# Patient Record
Sex: FEMALE | Race: WHITE | NOT HISPANIC OR LATINO | Employment: FULL TIME | URBAN - METROPOLITAN AREA
[De-identification: names, ages, dates, MRNs, and addresses within clinical notes are randomized per-mention and may not be internally consistent; named-entity substitution may affect disease eponyms.]

---

## 2017-09-20 ENCOUNTER — OFFICE VISIT (OUTPATIENT)
Dept: OBSTETRICS AND GYNECOLOGY | Facility: CLINIC | Age: 32
End: 2017-09-20
Payer: COMMERCIAL

## 2017-09-20 VITALS
BODY MASS INDEX: 37.82 KG/M2 | WEIGHT: 227 LBS | DIASTOLIC BLOOD PRESSURE: 86 MMHG | SYSTOLIC BLOOD PRESSURE: 126 MMHG | HEIGHT: 65 IN

## 2017-09-20 DIAGNOSIS — B96.89 BACTERIAL VAGINOSIS: ICD-10-CM

## 2017-09-20 DIAGNOSIS — Z30.011 ENCOUNTER FOR INITIAL PRESCRIPTION OF CONTRACEPTIVE PILLS: ICD-10-CM

## 2017-09-20 DIAGNOSIS — Z01.419 ENCOUNTER FOR GYNECOLOGICAL EXAMINATION (GENERAL) (ROUTINE) WITHOUT ABNORMAL FINDINGS: Primary | ICD-10-CM

## 2017-09-20 DIAGNOSIS — Z11.3 SCREENING FOR GONORRHEA: ICD-10-CM

## 2017-09-20 DIAGNOSIS — Z12.4 SCREENING FOR CERVICAL CANCER: ICD-10-CM

## 2017-09-20 DIAGNOSIS — Z30.09 ENCOUNTER FOR OTHER GENERAL COUNSELING OR ADVICE ON CONTRACEPTION: ICD-10-CM

## 2017-09-20 DIAGNOSIS — N76.0 BACTERIAL VAGINOSIS: ICD-10-CM

## 2017-09-20 PROCEDURE — 87480 CANDIDA DNA DIR PROBE: CPT

## 2017-09-20 PROCEDURE — 87591 N.GONORRHOEAE DNA AMP PROB: CPT

## 2017-09-20 PROCEDURE — 99999 PR PBB SHADOW E&M-NEW PATIENT-LVL III: CPT | Mod: PBBFAC,,, | Performed by: OBSTETRICS & GYNECOLOGY

## 2017-09-20 PROCEDURE — 99385 PREV VISIT NEW AGE 18-39: CPT | Mod: S$GLB,,, | Performed by: OBSTETRICS & GYNECOLOGY

## 2017-09-20 PROCEDURE — 88175 CYTOPATH C/V AUTO FLUID REDO: CPT

## 2017-09-20 PROCEDURE — 87660 TRICHOMONAS VAGIN DIR PROBE: CPT

## 2017-09-20 RX ORDER — NORGESTIMATE AND ETHINYL ESTRADIOL 7DAYSX3 28
1 KIT ORAL DAILY
Qty: 28 TABLET | Refills: 11 | Status: ON HOLD | OUTPATIENT
Start: 2017-09-20 | End: 2018-05-30 | Stop reason: HOSPADM
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
## 2017-09-22 ENCOUNTER — TELEPHONE (OUTPATIENT)
Dept: OBSTETRICS AND GYNECOLOGY | Facility: CLINIC | Age: 32
End: 2017-09-22

## 2017-09-22 DIAGNOSIS — N76.0 BACTERIAL VAGINOSIS: Primary | ICD-10-CM

## 2017-09-22 DIAGNOSIS — B96.89 BACTERIAL VAGINOSIS: Primary | ICD-10-CM

## 2017-09-22 LAB
C TRACH DNA SPEC QL NAA+PROBE: NOT DETECTED
CANDIDA RRNA VAG QL PROBE: NEGATIVE
G VAGINALIS RRNA GENITAL QL PROBE: POSITIVE
N GONORRHOEA DNA SPEC QL NAA+PROBE: NOT DETECTED
T VAGINALIS RRNA GENITAL QL PROBE: NEGATIVE

## 2017-09-22 RX ORDER — METRONIDAZOLE 7.5 MG/G
1 GEL VAGINAL DAILY
Qty: 5 APPLICATOR | Refills: 0 | Status: SHIPPED | OUTPATIENT
Start: 2017-09-22 | End: 2017-09-27

## 2017-09-22 NOTE — TELEPHONE ENCOUNTER
Pt. wants gel ([facility]). ssmitbar,karinan    Please advise her vaginal culture is positive for bacterial vaginosis.  This is overgrowth of your normal bacteria.  Does she prefer pills or vaginal gel for treatment?  Cultures are negative for gonorrhea and chlamydia

## 2018-04-20 ENCOUNTER — TELEPHONE (OUTPATIENT)
Dept: OBSTETRICS AND GYNECOLOGY | Facility: CLINIC | Age: 33
End: 2018-04-20

## 2018-04-20 NOTE — TELEPHONE ENCOUNTER
----- Message from Meme Mchugh sent at 4/20/2018  8:59 AM CDT -----  Contact: Pt  She is calling in regards to having issues with menstrual issues, pain and excessive bleeding and would like to see if she can be worked into the schedule for around the 30th and would like a callback at [phone] work, [phone] cell

## 2018-04-20 NOTE — TELEPHONE ENCOUNTER
Spoke to patient and she stated she would like an appointment close to the 30th of April because she wanted her menstrual cycle to be over with. Informed patient that Dr. Vogel does not have an appointment available during that date. Offered her to see a different NP or MD. Scheduled appointment for 04/30/18 at 9:30am at the '[place] location with Mary Lou Werner. Patient verbalized understanding.

## 2018-04-30 ENCOUNTER — OFFICE VISIT (OUTPATIENT)
Dept: OBSTETRICS AND GYNECOLOGY | Facility: CLINIC | Age: 33
End: 2018-04-30
Payer: COMMERCIAL

## 2018-04-30 VITALS
DIASTOLIC BLOOD PRESSURE: 100 MMHG | WEIGHT: 208.13 LBS | SYSTOLIC BLOOD PRESSURE: 153 MMHG | BODY MASS INDEX: 34.63 KG/M2

## 2018-04-30 DIAGNOSIS — N92.0 MENORRHAGIA WITH REGULAR CYCLE: Primary | ICD-10-CM

## 2018-04-30 PROCEDURE — 99999 PR PBB SHADOW E&M-EST. PATIENT-LVL III: CPT | Mod: PBBFAC,,, | Performed by: NURSE PRACTITIONER

## 2018-04-30 PROCEDURE — 99213 OFFICE O/P EST LOW 20 MIN: CPT | Mod: S$GLB,,, | Performed by: NURSE PRACTITIONER

## 2018-04-30 RX ORDER — ALBUTEROL SULFATE 90 UG/1
AEROSOL, METERED RESPIRATORY (INHALATION)
COMMUNITY
Start: 2017-01-11

## 2018-04-30 RX ORDER — WITCH HAZEL 50 %
2000 PADS, MEDICATED (EA) TOPICAL DAILY
COMMUNITY
End: 2018-05-22

## 2018-04-30 NOTE — PROGRESS NOTES
CC: Heavy cycles    Cherelle Shukla is a 32 y.o. female  presents for heavy cycles. LMP: Patient's last menstrual period was 2018 (exact date).. Patient was seen for same issue last year and started on OCP. OCP decreased the pain and cycle bleeding for a short time. Patient states for the past 3 months, cycles have been painful and heavy and she wants to consider an ablation.    Past Medical History:   Diagnosis Date    Asthma      Past Surgical History:   Procedure Laterality Date    EXTERNAL EAR SURGERY      TONSILLECTOMY       Social History     Social History    Marital status:      Spouse name: N/A    Number of children: N/A    Years of education: N/A     Occupational History    Not on file.     Social History Main Topics    Smoking status: Never Smoker    Smokeless tobacco: Never Used    Alcohol use No    Drug use: No    Sexual activity: Yes     Partners: Male     Birth control/ protection: Diaphragm     Other Topics Concern    Not on file     Social History Narrative    No narrative on file     Family History   Problem Relation Age of Onset    Breast cancer Paternal Grandmother     Colon cancer Neg Hx     Ovarian cancer Neg Hx      OB History      Para Term  AB Living    2 2 2     2    SAB TAB Ectopic Multiple Live Births                       BP (!) 153/100   Wt 94.4 kg (208 lb 1.8 oz)   LMP 2018 (Exact Date)   BMI 34.63 kg/m²       ROS:  GENERAL: Denies weight gain or weight loss. Feeling well overall.   CHEST: Denies chest pain or shortness of breath.   CARDIOVASCULAR: Denies palpitations or left sided chest pain.   ABDOMEN: No abdominal pain, constipation, diarrhea, nausea, vomiting or rectal bleeding.   URINARY: No frequency, dysuria, hematuria, or burning on urination.  REPRODUCTIVE: See HPI.     PHYSICAL EXAM:  APPEARANCE: Well nourished, well developed, in no acute distress.  ABDOMEN: Soft.  No tenderness or masses.  No hepatosplenomegaly.  No  hernias.  PELVIC: Normal external genitalia without lesions. Vagina moist and well rugated without lesions or discharge.  Cervix pink, without lesions, discharge or tenderness.  Bimanual exam shows uterus to be normal size, regular, mobile and nontender.  Adnexa without masses or tenderness.      1. Menorrhagia with regular cycle  CBC auto differential    TSH    US Pelvis Complete Non OB     PLAN:  Exam was unremarkable  Appt with Dr. Vogel  CBC and TSH      Patient was counseled today on A.C.S. Pap guidelines and recommendations for yearly pelvic exams, mammograms and monthly self breast exams; to see her PCP for other health maintenance.

## 2018-05-01 ENCOUNTER — LAB VISIT (OUTPATIENT)
Dept: LAB | Facility: HOSPITAL | Age: 33
End: 2018-05-01
Payer: COMMERCIAL

## 2018-05-01 DIAGNOSIS — N92.0 MENORRHAGIA WITH REGULAR CYCLE: ICD-10-CM

## 2018-05-01 LAB
BASOPHILS # BLD AUTO: 0.05 K/UL
BASOPHILS NFR BLD: 0.4 %
DIFFERENTIAL METHOD: ABNORMAL
EOSINOPHIL # BLD AUTO: 0.2 K/UL
EOSINOPHIL NFR BLD: 1.4 %
ERYTHROCYTE [DISTWIDTH] IN BLOOD BY AUTOMATED COUNT: 15.9 %
HCT VFR BLD AUTO: 39 %
HGB BLD-MCNC: 12.3 G/DL
IMM GRANULOCYTES # BLD AUTO: 0.05 K/UL
IMM GRANULOCYTES NFR BLD AUTO: 0.4 %
LYMPHOCYTES # BLD AUTO: 2.3 K/UL
LYMPHOCYTES NFR BLD: 19.3 %
MCH RBC QN AUTO: 28.7 PG
MCHC RBC AUTO-ENTMCNC: 31.5 G/DL
MCV RBC AUTO: 91 FL
MONOCYTES # BLD AUTO: 0.9 K/UL
MONOCYTES NFR BLD: 7.8 %
NEUTROPHILS # BLD AUTO: 8.3 K/UL
NEUTROPHILS NFR BLD: 70.7 %
NRBC BLD-RTO: 0 /100 WBC
PLATELET # BLD AUTO: 313 K/UL
PMV BLD AUTO: 10.9 FL
RBC # BLD AUTO: 4.29 M/UL
TSH SERPL DL<=0.005 MIU/L-ACNC: 0.65 UIU/ML
WBC # BLD AUTO: 11.76 K/UL

## 2018-05-01 PROCEDURE — 85025 COMPLETE CBC W/AUTO DIFF WBC: CPT

## 2018-05-01 PROCEDURE — 84443 ASSAY THYROID STIM HORMONE: CPT

## 2018-05-01 PROCEDURE — 36415 COLL VENOUS BLD VENIPUNCTURE: CPT

## 2018-05-03 ENCOUNTER — TELEPHONE (OUTPATIENT)
Dept: OBSTETRICS AND GYNECOLOGY | Facility: CLINIC | Age: 33
End: 2018-05-03

## 2018-05-03 NOTE — TELEPHONE ENCOUNTER
----- Message from Mary Lou Werner NP sent at 5/3/2018 10:22 AM CDT -----  Please call patient and inform her that labs are normal.

## 2018-05-07 ENCOUNTER — TELEPHONE (OUTPATIENT)
Dept: RADIOLOGY | Facility: HOSPITAL | Age: 33
End: 2018-05-07

## 2018-05-08 ENCOUNTER — HOSPITAL ENCOUNTER (OUTPATIENT)
Dept: RADIOLOGY | Facility: HOSPITAL | Age: 33
Discharge: HOME | End: 2018-05-08
Attending: NURSE PRACTITIONER
Payer: COMMERCIAL

## 2018-05-08 DIAGNOSIS — N92.0 MENORRHAGIA WITH REGULAR CYCLE: ICD-10-CM

## 2018-05-08 PROCEDURE — 76830 TRANSVAGINAL US NON-OB: CPT | Mod: 26,,, | Performed by: RADIOLOGY

## 2018-05-08 PROCEDURE — 76830 TRANSVAGINAL US NON-OB: CPT | Mod: TC

## 2018-05-08 PROCEDURE — 76856 US EXAM PELVIC COMPLETE: CPT | Mod: 26,,, | Performed by: RADIOLOGY

## 2018-05-09 ENCOUNTER — TELEPHONE (OUTPATIENT)
Dept: OBSTETRICS AND GYNECOLOGY | Facility: CLINIC | Age: 33
End: 2018-05-09

## 2018-05-09 ENCOUNTER — PATIENT MESSAGE (OUTPATIENT)
Dept: OBSTETRICS AND GYNECOLOGY | Facility: CLINIC | Age: 33
End: 2018-05-09

## 2018-05-09 DIAGNOSIS — N83.209 CYST OF OVARY, UNSPECIFIED LATERALITY: Primary | ICD-10-CM

## 2018-05-09 NOTE — TELEPHONE ENCOUNTER
Spoke with pt notified of pelvic u/s results, scheduled repeat u/s on 07/30/18 8:45 AM at [facility]. Patient verbalized understanding

## 2018-05-09 NOTE — TELEPHONE ENCOUNTER
----- Message from Mary Lou Werner NP sent at 5/9/2018 10:13 AM CDT -----  Please call patient and inform her that ultrasound indicated Mildly complex cyst within the right ovary. Will need a repeat in 12 weeks.

## 2018-05-09 NOTE — PROGRESS NOTES
Please call patient and inform her that ultrasound indicated Mildly complex cyst within the right ovary. Will need a repeat in 12 weeks.

## 2018-05-15 ENCOUNTER — TELEPHONE (OUTPATIENT)
Dept: OBSTETRICS AND GYNECOLOGY | Facility: CLINIC | Age: 33
End: 2018-05-15

## 2018-05-15 NOTE — TELEPHONE ENCOUNTER
----- Message from Beatrice Suggs sent at 5/15/2018  8:31 AM CDT -----  Pt at [phone] or [phone]//states she has an appt with Dr Vogel on 5-22-18//is calling to ask if possible to reschedule for today/5-15-18//please call asap//romy/lh

## 2018-05-15 NOTE — TELEPHONE ENCOUNTER
Pt states she is worried from the results of her ultrasound and c/o extreme fatigue while taking iron b12 with no improvement. Pt was offered a sooner appt with another provider but could not take those times offered. She will wait to see Dr. Vogel. Pt verbalizes understanding. BELKYS

## 2018-05-15 NOTE — TELEPHONE ENCOUNTER
Pt feels some relief from the results of the ultrasound and can't wait to see Dr. Vogel at her upcoming visit. DS

## 2018-05-15 NOTE — TELEPHONE ENCOUNTER
Ultrasound report reviewed    Please advise ovaries are both normal in size; the cyst that was seen is 1.4 cm (size of a cherry); and technically does not need a follow up sono;   But as per jordon mann's recommendation; she needs f/u sono--can do sono after next menses rather than waiting 12 wks

## 2018-05-22 ENCOUNTER — OFFICE VISIT (OUTPATIENT)
Dept: OBSTETRICS AND GYNECOLOGY | Facility: CLINIC | Age: 33
End: 2018-05-22
Payer: COMMERCIAL

## 2018-05-22 VITALS
DIASTOLIC BLOOD PRESSURE: 90 MMHG | BODY MASS INDEX: 35.63 KG/M2 | SYSTOLIC BLOOD PRESSURE: 136 MMHG | WEIGHT: 213.88 LBS | HEIGHT: 65 IN

## 2018-05-22 DIAGNOSIS — N94.6 DYSMENORRHEA: ICD-10-CM

## 2018-05-22 DIAGNOSIS — N92.0 MENORRHAGIA WITH REGULAR CYCLE: Primary | ICD-10-CM

## 2018-05-22 PROCEDURE — 3008F BODY MASS INDEX DOCD: CPT | Mod: CPTII,S$GLB,, | Performed by: OBSTETRICS & GYNECOLOGY

## 2018-05-22 PROCEDURE — 99999 PR PBB SHADOW E&M-EST. PATIENT-LVL III: CPT | Mod: PBBFAC,,, | Performed by: OBSTETRICS & GYNECOLOGY

## 2018-05-22 PROCEDURE — 99213 OFFICE O/P EST LOW 20 MIN: CPT | Mod: S$GLB,,, | Performed by: OBSTETRICS & GYNECOLOGY

## 2018-05-22 RX ORDER — BROMPHENIRAMINE MALEATE, PSEUDOEPHEDRINE HYDROCHLORIDE, AND DEXTROMETHORPHAN HYDROBROMIDE 2; 30; 10 MG/5ML; MG/5ML; MG/5ML
SYRUP ORAL
Refills: 0 | COMMUNITY
Start: 2018-05-14 | End: 2018-05-22

## 2018-05-22 RX ORDER — AZELASTINE 1 MG/ML
SPRAY, METERED NASAL
Refills: 0 | COMMUNITY
Start: 2018-05-14 | End: 2018-05-22

## 2018-05-22 RX ORDER — PREDNISONE 20 MG/1
TABLET ORAL
Refills: 0 | COMMUNITY
Start: 2018-05-14 | End: 2018-05-22

## 2018-05-22 RX ORDER — PAROXETINE 10 MG/5ML
SUSPENSION ORAL
COMMUNITY
End: 2018-05-22

## 2018-05-22 RX ORDER — AZITHROMYCIN 250 MG/1
TABLET, FILM COATED ORAL
Refills: 0 | COMMUNITY
Start: 2018-05-14 | End: 2018-05-22

## 2018-05-22 NOTE — PATIENT INSTRUCTIONS
Understanding Laparoscopic Hysterectomy  Having your uterus (womb) removed is called a hysterectomy. Using a technique called laparoscopy has many benefits. You may spend less time in the hospital and recover faster.  What is hysterectomy?  Hysterectomy removes the uterus. Part or all of the uterus may be taken out. Certain other organs may be removed at the same time. Having your uterus removed means that you wont be able to get pregnant in the future.  What is laparoscopy?  Laparoscopy is a type of surgery. A long, lighted tube with a camera is used. This is called a laparoscope. The scope sends pictures of the inside of the body to a video screen. For the surgery, a few small incisions are made in the abdomen. The scope is inserted through one of the small incisions. Surgical tools are inserted through the other incisions to complete the procedure.  Benefits of laparoscopy  This procedure lets you avoid open surgery. Open surgery requires a larger incision in the abdomen. Compared to open surgery laparoscopy may:  · Require less time in the hospital or surgery center  · Offer a faster recovery  · Cause less internal scarring and smaller visible scars  · Cause less pain after surgery  · Have a lower risk of complications  Risks and possible complications of laparoscopic hysterectomy  · Side effects from anesthesia  · Infection  · Bleeding, with a possible need for a transfusion  · Blood clots  · Damage to the bladder, bowel, ureters, or nearby nerves  · Hernia  · Formation of scar tissue that can cause pain or bowel obstruction often times years later  · Need for a second surgery  Date Last Reviewed: 3/1/2017  © 2936-2919 [employer]. [address] [place] [address], [place] [address]. All rights reserved. This information is not intended as a substitute for professional medical care. Always follow your healthcare professional's instructions.

## 2018-05-22 NOTE — PROGRESS NOTES
Subjective:       Patient ID: Cherelle Shukla is a 32 y.o. female.    Chief Complaint:  Consult (ablation)      History of Present Illness  HPI  here for problem   Reports despite ocp, feels menses have worsened  Flow 3-4 days, heavy; using ultra tampon/pad; change q 1-2 hrs; also worsening dysmenorrhea;   sono findings reviewed;   The uterus measures 7.2 x 4.3 x 5.1 cm.  Possible arcuate uterus morphology is seen.  The endometrial stripe measures on the order of 14 mm in thickness which is top-normal.  Right ovary measures 2.7 x 1.8 x 1.8 cm.  It demonstrates flow on Doppler interrogation.  There is a mildly complex appearing right ovarian cyst that measures 1.4 cm with irregular margins likely an involuting cyst.  The left ovary measures 2.8 x 1.9 x 1.4 cm and contains normal follicles and also demonstrates flow        GYN & OB History  Patient's last menstrual period was 2018 (exact date).   Date of Last Pap: 2017    OB History    Para Term  AB Living   2 2 2     2   SAB TAB Ectopic Multiple Live Births           2      # Outcome Date GA Lbr Chip/2nd Weight Sex Delivery Anes PTL Lv   2 Term      Vag-Spont   ANAMARIA   1 Term      Vag-Spont   ANAMARIA          Review of Systems  Review of Systems   Constitutional: Negative for activity change, appetite change, chills, diaphoresis, fatigue, fever and unexpected weight change.   HENT: Negative for mouth sores and tinnitus.    Eyes: Negative for discharge and visual disturbance.   Respiratory: Negative for cough, shortness of breath and wheezing.    Cardiovascular: Negative for chest pain, palpitations and leg swelling.   Gastrointestinal: Negative for abdominal pain, bloating, blood in stool, constipation, diarrhea, nausea and vomiting.   Endocrine: Negative for diabetes, hair loss, hot flashes, hyperthyroidism and hypothyroidism.   Genitourinary: Positive for menorrhagia and dysmenorrhea. Negative for decreased libido, dyspareunia, dysuria, flank pain,  frequency, genital sores, hematuria, menstrual problem, pelvic pain, urgency, vaginal bleeding, vaginal discharge, vaginal pain, urinary incontinence, postcoital bleeding, postmenopausal bleeding and vaginal odor.   Musculoskeletal: Negative for back pain and myalgias.   Skin:  Negative for rash, no acne and hair changes.   Neurological: Negative for seizures, syncope, numbness and headaches.   Hematological: Negative for adenopathy. Does not bruise/bleed easily.   Psychiatric/Behavioral: Negative for depression and sleep disturbance. The patient is not nervous/anxious.    Breast: Negative for breast mass, breast pain, nipple discharge and skin changes          Objective:    Physical Exam:   Constitutional: She appears well-developed.     Eyes: Conjunctivae and EOM are normal. Pupils are equal, round, and reactive to light.    Neck: Normal range of motion. Neck supple.     Pulmonary/Chest: Effort normal.        Abdominal: Soft.             Musculoskeletal: Normal range of motion.       Neurological: She is alert.    Skin: Skin is warm.    Psychiatric: She has a normal mood and affect.          Assessment:        Encounter Diagnoses   Name Primary?    Menorrhagia with regular cycle Yes    Dysmenorrhea                Plan:      Reviewed options for menorrhagia--continue ocp, depo provera, mirena iud, lysteda, ablation, hysterectomy  Patient does not want more children  Patient aware that ablation may not impove dysmenorrhea; patient also aware that menorrhagia may reoccur prior to cessation of menses in menopause due to her age  Patient wishes to proceed with ral; case request submitted; wishes to retain her ovaries

## 2018-05-23 ENCOUNTER — TELEPHONE (OUTPATIENT)
Dept: OBSTETRICS AND GYNECOLOGY | Facility: CLINIC | Age: 33
End: 2018-05-23

## 2018-05-23 DIAGNOSIS — N92.0 MENORRHAGIA WITH REGULAR CYCLE: Primary | ICD-10-CM

## 2018-05-23 DIAGNOSIS — N94.6 DYSMENORRHEA: ICD-10-CM

## 2018-05-23 NOTE — TELEPHONE ENCOUNTER
----- Message from Sowmya Chapin sent at 5/23/2018  7:53 AM CDT -----  Contact: self [phone]  States that she is returning Anna's call. Please call back at [phone]//thank you acc

## 2018-05-25 ENCOUNTER — HOSPITAL ENCOUNTER (OUTPATIENT)
Dept: PREADMISSION TESTING | Facility: HOSPITAL | Age: 33
Discharge: HOME | End: 2018-05-25
Attending: OBSTETRICS & GYNECOLOGY
Payer: COMMERCIAL

## 2018-05-25 VITALS — WEIGHT: 213 LBS | BODY MASS INDEX: 34.23 KG/M2 | HEIGHT: 66 IN

## 2018-05-25 NOTE — DISCHARGE INSTRUCTIONS
.To confirm, Your doctor has instructed you that surgery is scheduled for 5/30/18 at 08:30 am.       Please report to [facility], 1st floor, main lobby by 07:00 am.    Pre admit office will call afternoon prior to surgery with final arrival time    INSTRUCTIONS IMPORTANT!!!   Do not eat, drink, or smoke after 12 midnight. May have water and clear liquid juice until 3 hrs prior to surgery.   OK to brush teeth, no gum, candy or mints!    ¨ Take only these medicines with a small swallow of water-morning of surgery.  None    Pre operative instructions:  Please review the Pre-Operative Instruction booklet that you were given.        Bathing Instructions--See page 6 in the Pre-operative booklet.      Prevention of surgical site infections:     -Keep incisions clean and dry.   -Do not soak/submerge incisions in water until completely healed.   -Do not apply lotions, powders, creams, or deodorants to site.   -Always make sure hands are cleaned with antibacterial soap/ alcohol-based                 prior to touching the surgical site.  (This includes doctors,                 nurses, staff, and yourself.)    Signs and symptoms:   -Redness and pain around the area where you had surgery   -Drainage of cloudy fluid from your surgical wound   -Fever over 100.4       I have read or had read and explained to me, and understand the above information.  Additional comments or instructions:  Received a copy of Pre-operative instructions booklet, FAQ surgical site infection sheet, and packets of hibiclens (if indicated).

## 2018-05-29 ENCOUNTER — ANESTHESIA EVENT (OUTPATIENT)
Dept: SURGERY | Facility: HOSPITAL | Age: 33
End: 2018-05-29
Payer: COMMERCIAL

## 2018-05-29 ENCOUNTER — TELEPHONE (OUTPATIENT)
Dept: OBSTETRICS AND GYNECOLOGY | Facility: CLINIC | Age: 33
End: 2018-05-29

## 2018-05-29 NOTE — TELEPHONE ENCOUNTER
Patient is scheduled for surgery tomorrow  Had appt 2 wks ago; and due to surgery being able to be scheduled so quickly; did not want to come back in for repeat discussion    Hx of menorrhagia. Symptoms improved with ocp but now again heavy menses , using ultra tampon, change q 1-2 hrs;     Reports no further desire for fertility    Previously reviewed options; --continued observation, ocp, depo; lysteda, ablation, hysterectomy    Pt has decided upon hysterectomy    Reviewed ralh procedure in detail  Reviewed risks of procedure including infection, bleeding, damage to bowel/bladder, dvt, htn,cva  Patient aware hysterectomy will render her unable to have children    Patient aware that removal of tubes is recommended to decrease risk of ovarian cancer; patient wishes to retain ovaries if they are normal;     Patient aware if unable to complete through the robot will require open procedure    Pt was given opportunity to ask questions;     Again wishes to proceed with procedure; aware consents will be signed prior to surgical procedure tomorrow

## 2018-05-30 ENCOUNTER — SURGERY (OUTPATIENT)
Age: 33
End: 2018-05-30

## 2018-05-30 ENCOUNTER — PATIENT MESSAGE (OUTPATIENT)
Dept: SURGERY | Facility: HOSPITAL | Age: 33
End: 2018-05-30

## 2018-05-30 ENCOUNTER — ANESTHESIA (OUTPATIENT)
Dept: SURGERY | Facility: HOSPITAL | Age: 33
End: 2018-05-30
Payer: COMMERCIAL

## 2018-05-30 ENCOUNTER — HOSPITAL ENCOUNTER (OUTPATIENT)
Facility: HOSPITAL | Age: 33
Discharge: HOME | End: 2018-05-30
Attending: OBSTETRICS & GYNECOLOGY | Admitting: OBSTETRICS & GYNECOLOGY
Payer: COMMERCIAL

## 2018-05-30 DIAGNOSIS — Z90.710 S/P LAPAROSCOPIC HYSTERECTOMY: Primary | ICD-10-CM

## 2018-05-30 DIAGNOSIS — N92.0 MENORRHAGIA: ICD-10-CM

## 2018-05-30 LAB
B-HCG UR QL: NEGATIVE
CTP QC/QA: YES
POCT GLUCOSE: 92 MG/DL (ref 70–110)

## 2018-05-30 PROCEDURE — 25000003 PHARM REV CODE 250: Performed by: NURSE ANESTHETIST, CERTIFIED REGISTERED

## 2018-05-30 PROCEDURE — 71000039 HC RECOVERY, EACH ADD'L HOUR: Performed by: OBSTETRICS & GYNECOLOGY

## 2018-05-30 PROCEDURE — 63600175 PHARM REV CODE 636 W HCPCS: Performed by: NURSE ANESTHETIST, CERTIFIED REGISTERED

## 2018-05-30 PROCEDURE — 63600175 PHARM REV CODE 636 W HCPCS: Performed by: OBSTETRICS & GYNECOLOGY

## 2018-05-30 PROCEDURE — 36000713 HC OR TIME LEV V EA ADD 15 MIN: Performed by: OBSTETRICS & GYNECOLOGY

## 2018-05-30 PROCEDURE — 71000033 HC RECOVERY, INTIAL HOUR: Performed by: OBSTETRICS & GYNECOLOGY

## 2018-05-30 PROCEDURE — 81025 URINE PREGNANCY TEST: CPT | Performed by: OBSTETRICS & GYNECOLOGY

## 2018-05-30 PROCEDURE — 88307 TISSUE EXAM BY PATHOLOGIST: CPT | Performed by: PATHOLOGY

## 2018-05-30 PROCEDURE — 58571 TLH W/T/O 250 G OR LESS: CPT | Mod: ,,, | Performed by: OBSTETRICS & GYNECOLOGY

## 2018-05-30 PROCEDURE — 71000015 HC POSTOP RECOV 1ST HR: Performed by: OBSTETRICS & GYNECOLOGY

## 2018-05-30 PROCEDURE — 37000009 HC ANESTHESIA EA ADD 15 MINS: Performed by: OBSTETRICS & GYNECOLOGY

## 2018-05-30 PROCEDURE — 88307 TISSUE EXAM BY PATHOLOGIST: CPT | Mod: 26,,, | Performed by: PATHOLOGY

## 2018-05-30 PROCEDURE — 36000712 HC OR TIME LEV V 1ST 15 MIN: Performed by: OBSTETRICS & GYNECOLOGY

## 2018-05-30 PROCEDURE — 37000008 HC ANESTHESIA 1ST 15 MINUTES: Performed by: OBSTETRICS & GYNECOLOGY

## 2018-05-30 PROCEDURE — 25000003 PHARM REV CODE 250: Performed by: ANESTHESIOLOGY

## 2018-05-30 PROCEDURE — C2628 CATHETER, OCCLUSION: HCPCS | Performed by: OBSTETRICS & GYNECOLOGY

## 2018-05-30 PROCEDURE — 27201423 OPTIME MED/SURG SUP & DEVICES STERILE SUPPLY: Performed by: OBSTETRICS & GYNECOLOGY

## 2018-05-30 PROCEDURE — 63600175 PHARM REV CODE 636 W HCPCS: Performed by: ANESTHESIOLOGY

## 2018-05-30 RX ORDER — FENTANYL CITRATE 50 UG/ML
25 INJECTION, SOLUTION INTRAMUSCULAR; INTRAVENOUS EVERY 5 MIN PRN
Status: COMPLETED | OUTPATIENT
Start: 2018-05-30 | End: 2018-05-30

## 2018-05-30 RX ORDER — HYDROCODONE BITARTRATE AND ACETAMINOPHEN 5; 325 MG/1; MG/1
1 TABLET ORAL EVERY 4 HOURS PRN
Status: DISCONTINUED | OUTPATIENT
Start: 2018-05-30 | End: 2018-05-30 | Stop reason: HOSPADM

## 2018-05-30 RX ORDER — SODIUM CHLORIDE, SODIUM LACTATE, POTASSIUM CHLORIDE, CALCIUM CHLORIDE 600; 310; 30; 20 MG/100ML; MG/100ML; MG/100ML; MG/100ML
INJECTION, SOLUTION INTRAVENOUS CONTINUOUS PRN
Status: DISCONTINUED | OUTPATIENT
Start: 2018-05-30 | End: 2018-05-30

## 2018-05-30 RX ORDER — LIDOCAINE HYDROCHLORIDE 10 MG/ML
1 INJECTION, SOLUTION EPIDURAL; INFILTRATION; INTRACAUDAL; PERINEURAL ONCE
Status: DISCONTINUED | OUTPATIENT
Start: 2018-05-30 | End: 2018-05-30 | Stop reason: HOSPADM

## 2018-05-30 RX ORDER — IBUPROFEN 800 MG/1
800 TABLET ORAL EVERY 8 HOURS PRN
Qty: 30 TABLET | Refills: 0 | Status: SHIPPED | OUTPATIENT
Start: 2018-05-30 | End: 2018-06-09

## 2018-05-30 RX ORDER — SODIUM CHLORIDE 0.9 % (FLUSH) 0.9 %
3 SYRINGE (ML) INJECTION
Status: DISCONTINUED | OUTPATIENT
Start: 2018-05-30 | End: 2018-05-30 | Stop reason: HOSPADM

## 2018-05-30 RX ORDER — IBUPROFEN 600 MG/1
600 TABLET ORAL EVERY 6 HOURS
Status: DISCONTINUED | OUTPATIENT
Start: 2018-05-31 | End: 2018-05-30 | Stop reason: HOSPADM

## 2018-05-30 RX ORDER — PROPOFOL 10 MG/ML
VIAL (ML) INTRAVENOUS
Status: DISCONTINUED | OUTPATIENT
Start: 2018-05-30 | End: 2018-05-30

## 2018-05-30 RX ORDER — SCOLOPAMINE TRANSDERMAL SYSTEM 1 MG/1
PATCH, EXTENDED RELEASE TRANSDERMAL
Status: DISCONTINUED | OUTPATIENT
Start: 2018-05-30 | End: 2018-05-30

## 2018-05-30 RX ORDER — ONDANSETRON 8 MG/1
8 TABLET, ORALLY DISINTEGRATING ORAL EVERY 8 HOURS PRN
Status: DISCONTINUED | OUTPATIENT
Start: 2018-05-30 | End: 2018-05-30 | Stop reason: HOSPADM

## 2018-05-30 RX ORDER — SCOLOPAMINE TRANSDERMAL SYSTEM 1 MG/1
PATCH, EXTENDED RELEASE TRANSDERMAL
Status: COMPLETED
Start: 2018-05-30 | End: 2018-05-30

## 2018-05-30 RX ORDER — HYDROCODONE BITARTRATE AND ACETAMINOPHEN 7.5; 325 MG/1; MG/1
1 TABLET ORAL EVERY 4 HOURS PRN
Status: DISCONTINUED | OUTPATIENT
Start: 2018-05-30 | End: 2018-05-30 | Stop reason: HOSPADM

## 2018-05-30 RX ORDER — HYDROCODONE BITARTRATE AND ACETAMINOPHEN 5; 325 MG/1; MG/1
1 TABLET ORAL EVERY 4 HOURS PRN
Qty: 20 TABLET | Refills: 0 | Status: SHIPPED | OUTPATIENT
Start: 2018-05-30 | End: 2018-05-31

## 2018-05-30 RX ORDER — GLYCOPYRROLATE 0.2 MG/ML
INJECTION INTRAMUSCULAR; INTRAVENOUS
Status: DISCONTINUED | OUTPATIENT
Start: 2018-05-30 | End: 2018-05-30

## 2018-05-30 RX ORDER — MIDAZOLAM HYDROCHLORIDE 1 MG/ML
INJECTION, SOLUTION INTRAMUSCULAR; INTRAVENOUS
Status: DISCONTINUED | OUTPATIENT
Start: 2018-05-30 | End: 2018-05-30

## 2018-05-30 RX ORDER — FENTANYL CITRATE 50 UG/ML
INJECTION, SOLUTION INTRAMUSCULAR; INTRAVENOUS
Status: DISCONTINUED | OUTPATIENT
Start: 2018-05-30 | End: 2018-05-30

## 2018-05-30 RX ORDER — DIPHENHYDRAMINE HYDROCHLORIDE 50 MG/ML
25 INJECTION INTRAMUSCULAR; INTRAVENOUS EVERY 4 HOURS PRN
Status: DISCONTINUED | OUTPATIENT
Start: 2018-05-30 | End: 2018-05-30 | Stop reason: HOSPADM

## 2018-05-30 RX ORDER — SODIUM CHLORIDE 0.9 % (FLUSH) 0.9 %
3 SYRINGE (ML) INJECTION EVERY 8 HOURS
Status: DISCONTINUED | OUTPATIENT
Start: 2018-05-30 | End: 2018-05-30 | Stop reason: HOSPADM

## 2018-05-30 RX ORDER — DIPHENHYDRAMINE HCL 25 MG
25 CAPSULE ORAL EVERY 4 HOURS PRN
Status: DISCONTINUED | OUTPATIENT
Start: 2018-05-30 | End: 2018-05-30 | Stop reason: HOSPADM

## 2018-05-30 RX ORDER — FENTANYL CITRATE 50 UG/ML
25 INJECTION, SOLUTION INTRAMUSCULAR; INTRAVENOUS EVERY 5 MIN PRN
Status: DISCONTINUED | OUTPATIENT
Start: 2018-05-30 | End: 2018-05-30 | Stop reason: SDUPTHER

## 2018-05-30 RX ORDER — NEOSTIGMINE METHYLSULFATE 1 MG/ML
INJECTION, SOLUTION INTRAVENOUS
Status: DISCONTINUED | OUTPATIENT
Start: 2018-05-30 | End: 2018-05-30

## 2018-05-30 RX ORDER — LIDOCAINE HYDROCHLORIDE 10 MG/ML
INJECTION INFILTRATION; PERINEURAL
Status: DISCONTINUED | OUTPATIENT
Start: 2018-05-30 | End: 2018-05-30

## 2018-05-30 RX ORDER — HYDROMORPHONE HYDROCHLORIDE 1 MG/ML
0.5 INJECTION, SOLUTION INTRAMUSCULAR; INTRAVENOUS; SUBCUTANEOUS EVERY 5 MIN PRN
Status: COMPLETED | OUTPATIENT
Start: 2018-05-30 | End: 2018-05-30

## 2018-05-30 RX ORDER — CEFAZOLIN SODIUM 1 G/50ML
2 SOLUTION INTRAVENOUS
Status: COMPLETED | OUTPATIENT
Start: 2018-05-30 | End: 2018-05-30

## 2018-05-30 RX ORDER — BISACODYL 10 MG
10 SUPPOSITORY, RECTAL RECTAL DAILY PRN
Status: DISCONTINUED | OUTPATIENT
Start: 2018-05-30 | End: 2018-05-30 | Stop reason: HOSPADM

## 2018-05-30 RX ORDER — KETOROLAC TROMETHAMINE 30 MG/ML
30 INJECTION, SOLUTION INTRAMUSCULAR; INTRAVENOUS EVERY 6 HOURS
Status: DISCONTINUED | OUTPATIENT
Start: 2018-05-30 | End: 2018-05-30 | Stop reason: HOSPADM

## 2018-05-30 RX ORDER — ACETAMINOPHEN 10 MG/ML
INJECTION, SOLUTION INTRAVENOUS
Status: DISCONTINUED | OUTPATIENT
Start: 2018-05-30 | End: 2018-05-30

## 2018-05-30 RX ORDER — AMOXICILLIN 250 MG
1 CAPSULE ORAL 2 TIMES DAILY
Status: DISCONTINUED | OUTPATIENT
Start: 2018-05-30 | End: 2018-05-30 | Stop reason: HOSPADM

## 2018-05-30 RX ORDER — OXYCODONE HYDROCHLORIDE 5 MG/1
5 TABLET ORAL ONCE
Status: COMPLETED | OUTPATIENT
Start: 2018-05-30 | End: 2018-05-30

## 2018-05-30 RX ORDER — MEPERIDINE HYDROCHLORIDE 50 MG/ML
12.5 INJECTION INTRAMUSCULAR; INTRAVENOUS; SUBCUTANEOUS ONCE AS NEEDED
Status: COMPLETED | OUTPATIENT
Start: 2018-05-30 | End: 2018-05-30

## 2018-05-30 RX ORDER — ROCURONIUM BROMIDE 10 MG/ML
INJECTION, SOLUTION INTRAVENOUS
Status: DISCONTINUED | OUTPATIENT
Start: 2018-05-30 | End: 2018-05-30

## 2018-05-30 RX ORDER — SODIUM CHLORIDE 0.9 % (FLUSH) 0.9 %
3 SYRINGE (ML) INJECTION EVERY 8 HOURS
Status: DISCONTINUED | OUTPATIENT
Start: 2018-05-30 | End: 2018-05-30 | Stop reason: SDUPTHER

## 2018-05-30 RX ORDER — SODIUM CHLORIDE 0.9 % (FLUSH) 0.9 %
3 SYRINGE (ML) INJECTION
Status: DISCONTINUED | OUTPATIENT
Start: 2018-05-30 | End: 2018-05-30 | Stop reason: SDUPTHER

## 2018-05-30 RX ORDER — ONDANSETRON 2 MG/ML
INJECTION INTRAMUSCULAR; INTRAVENOUS
Status: DISCONTINUED | OUTPATIENT
Start: 2018-05-30 | End: 2018-05-30

## 2018-05-30 RX ORDER — ACETAMINOPHEN 10 MG/ML
1000 INJECTION, SOLUTION INTRAVENOUS ONCE
Status: DISCONTINUED | OUTPATIENT
Start: 2018-05-30 | End: 2018-05-30 | Stop reason: SDUPTHER

## 2018-05-30 RX ORDER — DEXAMETHASONE SODIUM PHOSPHATE 4 MG/ML
INJECTION, SOLUTION INTRA-ARTICULAR; INTRALESIONAL; INTRAMUSCULAR; INTRAVENOUS; SOFT TISSUE
Status: DISCONTINUED | OUTPATIENT
Start: 2018-05-30 | End: 2018-05-30

## 2018-05-30 RX ORDER — MEPERIDINE HYDROCHLORIDE 50 MG/ML
12.5 INJECTION INTRAMUSCULAR; INTRAVENOUS; SUBCUTANEOUS ONCE AS NEEDED
Status: DISCONTINUED | OUTPATIENT
Start: 2018-05-30 | End: 2018-05-30 | Stop reason: SDUPTHER

## 2018-05-30 RX ADMIN — SCOPOLAMINE 1 PATCH: 1 PATCH, EXTENDED RELEASE TRANSDERMAL at 10:05

## 2018-05-30 RX ADMIN — NEOSTIGMINE METHYLSULFATE 5 MG: 1 INJECTION INTRAVENOUS at 10:05

## 2018-05-30 RX ADMIN — FENTANYL CITRATE 50 MCG: 50 INJECTION, SOLUTION INTRAMUSCULAR; INTRAVENOUS at 10:05

## 2018-05-30 RX ADMIN — PROPOFOL 180 MG: 10 INJECTION, EMULSION INTRAVENOUS at 08:05

## 2018-05-30 RX ADMIN — CEFAZOLIN SODIUM 2 G: 1 SOLUTION INTRAVENOUS at 09:05

## 2018-05-30 RX ADMIN — FENTANYL CITRATE 25 MCG: 50 INJECTION, SOLUTION INTRAMUSCULAR; INTRAVENOUS at 11:05

## 2018-05-30 RX ADMIN — MEPERIDINE HYDROCHLORIDE 12.5 MG: 50 INJECTION INTRAMUSCULAR; INTRAVENOUS; SUBCUTANEOUS at 10:05

## 2018-05-30 RX ADMIN — Medication 0.5 MG: at 11:05

## 2018-05-30 RX ADMIN — FENTANYL CITRATE 25 MCG: 50 INJECTION, SOLUTION INTRAMUSCULAR; INTRAVENOUS at 10:05

## 2018-05-30 RX ADMIN — FENTANYL CITRATE 50 MCG: 50 INJECTION, SOLUTION INTRAMUSCULAR; INTRAVENOUS at 09:05

## 2018-05-30 RX ADMIN — ROBINUL 0.8 MG: 0.2 INJECTION INTRAMUSCULAR; INTRAVENOUS at 10:05

## 2018-05-30 RX ADMIN — ONDANSETRON 4 MG: 2 INJECTION, SOLUTION INTRAMUSCULAR; INTRAVENOUS at 10:05

## 2018-05-30 RX ADMIN — KETOROLAC TROMETHAMINE 30 MG: 30 INJECTION, SOLUTION INTRAMUSCULAR; INTRAVENOUS at 11:05

## 2018-05-30 RX ADMIN — DEXAMETHASONE SODIUM PHOSPHATE 8 MG: 4 INJECTION, SOLUTION INTRA-ARTICULAR; INTRALESIONAL; INTRAMUSCULAR; INTRAVENOUS; SOFT TISSUE at 09:05

## 2018-05-30 RX ADMIN — ROCURONIUM BROMIDE 50 MG: 10 INJECTION, SOLUTION INTRAVENOUS at 08:05

## 2018-05-30 RX ADMIN — SODIUM CHLORIDE, SODIUM LACTATE, POTASSIUM CHLORIDE, AND CALCIUM CHLORIDE: 600; 310; 30; 20 INJECTION, SOLUTION INTRAVENOUS at 08:05

## 2018-05-30 RX ADMIN — ACETAMINOPHEN 1000 MG: 10 INJECTION, SOLUTION INTRAVENOUS at 09:05

## 2018-05-30 RX ADMIN — MIDAZOLAM 2 MG: 1 INJECTION INTRAMUSCULAR; INTRAVENOUS at 08:05

## 2018-05-30 RX ADMIN — PROPOFOL 20 MG: 10 INJECTION, EMULSION INTRAVENOUS at 09:05

## 2018-05-30 RX ADMIN — OXYCODONE HYDROCHLORIDE 5 MG: 5 TABLET ORAL at 11:05

## 2018-05-30 RX ADMIN — LIDOCAINE HYDROCHLORIDE 40 MG: 10 INJECTION, SOLUTION INFILTRATION; PERINEURAL at 08:05

## 2018-05-30 RX ADMIN — FENTANYL CITRATE 50 MCG: 50 INJECTION, SOLUTION INTRAMUSCULAR; INTRAVENOUS at 08:05

## 2018-05-30 RX ADMIN — SODIUM CHLORIDE, SODIUM LACTATE, POTASSIUM CHLORIDE, AND CALCIUM CHLORIDE: 600; 310; 30; 20 INJECTION, SOLUTION INTRAVENOUS at 09:05

## 2018-05-30 NOTE — H&P
[facility] -   Obstetrics & Gynecology  History & Physical    Patient Name: Cherelle Shukla  MRN: [id number]  Admission Date: 2018  Primary Care Provider: Michael Balderrama MD    Subjective:     Chief Complaint/Reason for Admission: menorrhagia    History of Present Illness:  33 y/o  with worsening menorrhagia despite use of ocp.  Patient wishes definitive management with hysterectomy.  Patient wishes to retain ovaries    Reports using ocp monthly, however, menses heavy, using ultra tampon, and has to change q 1-2 hours.  Reports no desire for future fertility.    The uterus measures 7.2 x 4.3 x 5.1 cm.  Possible arcuate uterus morphology is seen.  The endometrial stripe measures on the order of 14 mm in thickness which is top-normal.  Right ovary measures 2.7 x 1.8 x 1.8 cm.  It demonstrates flow on Doppler interrogation.  There is a mildly complex appearing right ovarian cyst that measures 1.4 cm with irregular margins likely an involuting cyst.  The left ovary measures 2.8 x 1.9 x 1.4 cm and contains normal follicles and also demonstrates flow.    Obstetric History       T2      L2     SAB0   TAB0   Ectopic0   Multiple0   Live Births2       # Outcome Date GA Lbr Chip/2nd Weight Sex Delivery Anes PTL Lv   2 Term      Vag-Spont   ANAMARIA   1 Term      Vag-Spont   ANAMARIA        Past Medical History:   Diagnosis Date    Asthma     PONV (postoperative nausea and vomiting)      Past Surgical History:   Procedure Laterality Date    EXTERNAL EAR SURGERY Bilateral     Several    TONSILLECTOMY         PTA Medications   Medication Sig    albuterol 90 mcg/actuation inhaler Inhale into the lungs.    norgestimate-ethinyl estradiol (ORTHO TRI-CYCLEN,TRI-SPRINTEC) 0.18/0.215/0.25 mg-35 mcg (28) tablet Take 1 tablet by mouth once daily.       Review of patient's allergies indicates:   Allergen Reactions    Sulfa (sulfonamide antibiotics)      As a child Unknown        Family History     Problem  Relation (Age of Onset)    Asthma Mother    Breast cancer Paternal Grandmother    Hypertension Mother    Ovarian cancer Paternal Grandmother    Polycystic ovary syndrome Mother        Social History Main Topics    Smoking status: Never Smoker    Smokeless tobacco: Never Used    Alcohol use Yes      Comment: occasional    Drug use: No    Sexual activity: Yes     Partners: Male     Birth control/ protection: Pill     Review of Systems   Genitourinary: Positive for menorrhagia.   All other systems reviewed and are negative.     Objective:     Vital Signs (Most Recent):  Temp: 97.9 °F (36.6 °C) (05/30/18 0715)  Pulse: 77 (05/30/18 0715)  Resp: 16 (05/30/18 0715)  BP: (!) 160/100 (05/30/18 0715)  SpO2: 98 % (05/30/18 0715) Vital Signs (24h Range):  Temp:  [97.9 °F (36.6 °C)] 97.9 °F (36.6 °C)  Pulse:  [77] 77  Resp:  [16] 16  SpO2:  [98 %] 98 %  BP: (160)/(100) 160/100     Weight: 94.8 kg (208 lb 15.9 oz)  Body mass index is 34.25 kg/m².    Patient's last menstrual period was 05/17/2018 (exact date).    Physical Exam:   Constitutional: She appears well-developed.     Eyes: Conjunctivae and EOM are normal. Pupils are equal, round, and reactive to light.    Neck: Normal range of motion. Neck supple.     Pulmonary/Chest: Effort normal.        Abdominal: Soft. Bowel sounds are normal.             Musculoskeletal: Normal range of motion.       Neurological: She is alert.    Skin: Skin is warm.    Psychiatric: She has a normal mood and affect.       Laboratory:  Beta HCG: No results for input(s): HCGPREGUR in the last 48 hours.  BMP: No results for input(s): GLU, NA, K, CL, CO2, BUN, CREATININE, CALCIUM, MG in the last 48 hours.  CBC: No results for input(s): WBC, RBC, HGB, HCT, PLT, MCV, MCH, MCHC in the last 48 hours.  hgb 12.9;   bhcg  <1.2  Diagnostic Results:  US: Reviewed  see results above    Assessment/Plan:     * Menorrhagia    Menorrhagia despite use of ocp  Ready to proceed with robotic hysterectomy with  bilateral salpingectomy  Reviewed robotic assisted laparoscopic hysterectomy with bilateral salpingectomy procedure in detail.   Reviewed risks including but not limited to infection, bleeding, damage to bowel/bladder, cva;htn, death.  Pt aware procedure will render her unable to have children.     All questions answered to the best of my ability.  Alternatives reviewed --continue ocp, mirena/nsaid, depo, nuva ring, nexplanon.  Patient is adamant about decision to proceed with hysterectomy;  Consents previously signed and witnessed.    Routine antibiotics            Carolyn Vogel MD  Obstetrics & Gynecology  [facility]

## 2018-05-30 NOTE — DISCHARGE INSTRUCTIONS
General Information:    1. Do not drink alcoholic beverages including beer for 24 hours or as long as you are on pain medication..  2. Do not drive a motor vehicle, operate machinery or power tools, or signs legal papers for 24 hours or as long as you are on pain medication.   3. You may experience light-headedness, dizziness, and sleepiness following surgery. Please do not stay alone. A responsible adult should be with you for this 24 hour period.  4. Go home and rest.  5. Progress slowly to a normal diet unless instructed.  Otherwise, begin with liquids such as soft drinks, then soup and crackers working up to solid foods. Drink plenty of nonalcoholic fluids.  6. Certain anesthetics and pain medications produce nausea and vomiting in certain individuals. If nausea becomes a problem at home, call you doctor.  7. A nurse will be calling you sometime after surgery. Do not be alarmed. This is our way of finding out how you are doing.  8. Several times every hour while you are awake, take 2-3 deep breaths and cough. If you had stomach surgery hold a pillow or rolled towel firmly against your stomach before you cough. This will help with any pain the cough might cause.  9. Several times every hour while you are awake, pump and flex your feet 5-6 times and do foot circles. This will help prevent blood clots.  10. Call your doctor for severe pain, bleeding, fever, or signs or symptoms of infection (pain, swelling, redness, foul odor, drainage).  11. You can contact your doctor anytime by [phone] for the [facility] (at [facility]) or [phone] for the [facility] on [facility].   [email or web address] is another way to contact your doctor if you are an active participant online with [facility].  12. Continue Nozin provided at discharge twice daily for 7 days or until the incision is healed.  See pamphlet or box for instructions.

## 2018-05-30 NOTE — TRANSFER OF CARE
"Anesthesia Transfer of Care Note    Patient: Cherelle Shukla    Procedure(s) Performed: Procedure(s) (LRB):  XI ROBOTIC ASSISTED LAPAROSCOPIC HYSTERECTOMY (N/A)  ROBOTIC SALPINGECTOMY (Bilateral)    Patient location: PACU    Anesthesia Type: general    Transport from OR: Transported from OR on room air with adequate spontaneous ventilation    Post pain: adequate analgesia    Post assessment: no apparent anesthetic complications    Post vital signs: stable    Level of consciousness: awake    Nausea/Vomiting: no nausea/vomiting    Complications: none    Transfer of care protocol was followed      Last vitals:   Visit Vitals  BP (!) 160/100 (BP Location: Right arm, Patient Position: Sitting)   Pulse 77   Temp 36.6 °C (97.9 °F) (Temporal)   Resp 16   Ht 5' 5.5" (1.664 m)   Wt 94.8 kg (208 lb 15.9 oz)   LMP 05/17/2018 (Exact Date)   SpO2 98%   Breastfeeding? No   BMI 34.25 kg/m²     "

## 2018-05-30 NOTE — ANESTHESIA PROCEDURE NOTES
Intubation    Patient location during procedure: done in OR  Procedure start time: 5/30/2018 8:57 AM  Staffing  Anesthesiologist: MICHAEL DONALDSON  Resident/CRNA: DAWOOD BROWN  Performed: resident/CRNA and anesthesiologist   Preanesthetic Checklist  Completed: patient identified, site marked, surgical consent, pre-op evaluation, timeout performed, IV checked, risks and benefits discussed, monitors and equipment checked and anesthesia consent given  Intubation  Indication: surgery  Pre-oxygenation. Induction: intravenous, mask ventilation: easy mask.  Intubation: postinduction, laryngoscopy direct, Barron 2.  Endotracheal Tube: nasal, 7.0 mm ID, cuffed (inflated to minimal occlusive pressure)  Attempts: 1, Grade I - full view of cords  Complicating Factors: none  Tube secured at 22 cm at the lips.  Findings post-intubation: bilateral breath sounds, positive ETCO2, atraumatic / condition of teeth unchanged  Position Confirmation: auscultation  Eye Care: taped closed, taped after induction / before airway management

## 2018-05-30 NOTE — ANESTHESIA PREPROCEDURE EVALUATION
05/30/2018  Cherelle Shukla is a 32 y.o., female.    Anesthesia Evaluation    I have reviewed the Patient Summary Reports.    I have reviewed the Nursing Notes.      Review of Systems  Anesthesia Hx:  Hx of Anesthetic complications  Denies Family Hx of Anesthesia complications.  Personal Hx of Anesthesia complications, Post-Operative Nausea/Vomiting, with every anesthetic, treatment not known   Social:  Non-Smoker, No Alcohol Use    Hematology/Oncology:  Hematology Normal   Oncology Normal     EENT/Dental:EENT/Dental Normal   Cardiovascular:  Cardiovascular Normal     Pulmonary:   Asthma mild    Renal/:  Renal/ Normal     Hepatic/GI:  Hepatic/GI Normal    Musculoskeletal:  Musculoskeletal Normal    OB/GYN/PEDS:  Dysmenorrhea   Neurological:  Neurology Normal    Endocrine:  Endocrine Normal    Dermatological:  Skin Normal    Psych:  Psychiatric Normal           Physical Exam  General:  Well nourished    Airway/Jaw/Neck:  Airway Findings: Mouth Opening: Normal General Airway Assessment: Adult  Mallampati: I  TM Distance: Normal, at least 6 cm  Jaw/Neck Findings:  Neck ROM: Normal ROM      Dental:  Dental Findings: In tact   Chest/Lungs:  Chest/Lungs Findings: Normal Respiratory Rate, Clear to auscultation     Heart/Vascular:  Heart Findings: Rate: Normal  Rhythm: Regular Rhythm        Mental Status:  Mental Status Findings:  Cooperative, Alert and Oriented         Anesthesia Plan  Type of Anesthesia, risks & benefits discussed:  Anesthesia Type:  general  Patient's Preference:   Intra-op Monitoring Plan: standard ASA monitors  Intra-op Monitoring Plan Comments:   Post Op Pain Control Plan: IV/PO Opioids PRN  Post Op Pain Control Plan Comments:   Induction:   IV  Beta Blocker:  Patient is not currently on a Beta-Blocker (No further documentation required).       Informed Consent: Patient understands risks  and agrees with Anesthesia plan.  Questions answered. Anesthesia consent signed with patient.  ASA Score: 2     Day of Surgery Review of History & Physical: I have interviewed and examined the patient. I have reviewed the patient's H&P dated:            Ready For Surgery From Anesthesia Perspective.

## 2018-05-30 NOTE — OP NOTE
[facility] -   General Surgery  Operative Note    SUMMARY     Date of Procedure: 5/30/2018     Procedure: Procedure(s) (LRB):  XI ROBOTIC ASSISTED LAPAROSCOPIC HYSTERECTOMY (N/A)  ROBOTIC SALPINGECTOMY (Bilateral)       Surgeon(s) and Role:     * Carolyn Vogel MD - Primary    Assisting Surgeon: maicol Douglas    Pre-Operative Diagnosis: Dysmenorrhea [N94.6]  Menorrhagia with regular cycle [N92.0]    Post-Operative Diagnosis: Post-Op Diagnosis Codes:     * Dysmenorrhea [N94.6]     * Menorrhagia with regular cycle [N92.0]    Anesthesia: General    Technical Procedures Used: robotic assisted laparoscopic hysterectomy with bilateral salpingectomy    The patient was taken to the Operating Room where general        endotracheal anesthesia was induced and found to be adequate.  She was       then placed in the dorsal lithotomy position in the Flavio stirrups and her        arms tucked at her sides.  Her perineum was then prepped and draped in a          normal sterile fashion.  A Matt catheter was placed in her bladder and hung to     gravity.  A FLACO intrauterine manipulator with a KOH colpotomizer cup and    a vaginal occluder balloon were then placed with a good fit.  All other        instruments were removed from the vagina, and her legs were placed in a      low lithotomy position.  The patient's abdomen was then prepped and draped in    the normal sterile fashion.  Pre-op antibiotics were given.  Time out was performed.    The periumbilical skin was then tented up with perforating towel clamps,     and the Veress needle  was inserted through the umbilicus    into the intraperitoneal cavity.  Intraperitoneal placement was  confirmed with                     a water drop test, and pneumoperitoneum was achieved with carbon dioxide     gas up to a pressure of 15 mmHg.  The Veress needle was then removed, and an 8mm  skin incision was made at the umbilicus.  An 8mm trocar was inserted         through this incision.  The scope was then inserted through this trocar.             Inspection of underlying organs revealed no damage or injury.  Then, 8-mm    trocars were placed bilaterally under direct visualization, and a 5-mm       left  upper quadrant trocar was placed under direct visualization.  All            instruments were then removed from the trocars and the patient was placed    in Trendelenburg position.  The patient cart for the da Tran surgical       system was then docked at the bedside.  We then proceeded with  the             hysterectomy.                                                                                                                                             The left round ligament was then cauterized and transected.  This opened     up  the leaf of the broad ligament on the left side, and this incision was       carried anteromedially to create a bladder flap.  The left fallopian tube was then excised working from the fimbriated end to the cornual end along the mesosalpinx. The left uteroovarian ligament was then doubly cauterized and transected.  The left uterine artery was then skeletonized.  We then proceeded to take down the    right round ligament.  The right round ligament was cauterized and transected.  This opened up the broad ligament on the right side and this incision was carried anteromedially.  The bladder flap was then completed using both cautery and blunt dissection.  The right fallopian tube was then excised working from the fimbriated end to the cornual end along the mesosalpinx.  The right uteroovarian ligament was then doubly cauterized and transected.  The right uterine artery was then skeletonized.  The uterus was then sharply retroflexed, and an anterior colpotomy was made with the monopolar EndoShears along the level of the KOH cup.  The uterus was then sharply anteflexed, and a posterior colpotomy was made with the EndoShears along the level of the  KOH cup.      The  colpotomy incision was carried around towards the left side and the left     uterine artery was doubly cauterized and transected.      We then moved [id number] along the right aspect of the KOH    cup.  The right uterine artery was then doubly cauterized and transected.    The colpotomy was then completed working [id number].  The     uterus and cervix were then removed and remained in the vagina to help       maintain pneumoperitoneum for the rest of the case.     The cuff was  irrigated  and cleared of all clots and debris.  Hemostasis at the cuff was achieved with cautery.  The vaginal cuff was then reapproximated with 0-Vicryl in a running fashion starting at each angle and tying in the middle--with blue surgiclip at end of suture.  nivia.  Again, the pelvis was irrigated and cleared of all clots and debris.  Excellent hemostasis was noted.      The bilateral ureters were seen in their normal anatomic location and were vermiculating.    At this point, all instruments were removed from the trocars and the patient cart was undocked.  Pneumoperitoneum was then allowed to escape, and all trocars were removed.     Hemostasis at all skin sites was achieved with Bovie cautery.  All skin      sites were closed with 4-0 monocryl in a running subcuticular fashion.  The    patient tolerated the procedure well.  Sponge, lap and needle counts         correct x2.  She will go to Recovery in stable condition.    Description of the Findings of the Procedure: 8 wk size uterus; normal tubes/ovaries bilaterally  Significant Surgical Tasks Conducted by the Assistant(s), if Applicable: 1st assist    Complications: No    Estimated Blood Loss (EBL): 20 mL           Implants: * No implants in log *    Specimens:   Specimen (12h ago through future)    Start     Ordered    05/30/18 3727  Specimen to Pathology - Surgery  Once     Comments:  1. Uterus, cervix, bilateral fallopian tubes  Dx.Dysmenorrhea, Menorrhagia with regular cycle      05/30/18 0951                  Condition: Good    Disposition: PACU - hemodynamically stable.    Attestation: I performed the procedure.

## 2018-05-30 NOTE — BRIEF OP NOTE
[facility] -   Brief Operative Note     SUMMARY     Surgery Date: 5/30/2018     Surgeon(s) and Role:     * Carolyn Holder MD - Primary    Assisting Surgeon: None    Pre-op Diagnosis:  Dysmenorrhea [N94.6]  Menorrhagia with regular cycle [N92.0]    Post-op Diagnosis:  Post-Op Diagnosis Codes:     * Dysmenorrhea [N94.6]     * Menorrhagia with regular cycle [N92.0]    Procedure(s) (LRB):  XI ROBOTIC ASSISTED LAPAROSCOPIC HYSTERECTOMY (N/A)  ROBOTIC SALPINGECTOMY (Bilateral)    Anesthesia: General    Description of the findings of the procedure: see op note for details  Findings/Key Components: 8 wk size uterus; normal tubes and ovaries bilaterally    Estimated Blood Loss: 20 mL         Specimens:   Specimen (12h ago through future)    Start     Ordered    05/30/18 0908  Specimen to Pathology - Surgery  Once     Comments:  1. Uterus, cervix, bilateral fallopian tubes Dx.Dysmenorrhea, Menorrhagia with regular cycle      05/30/18 0951          Discharge Note    SUMMARY     Admit Date: 5/30/2018    Discharge Date and Time:  05/30/2018 10:41 AM    Hospital Course (synopsis of major diagnoses, care, treatment, and services provided during the course of the hospital stay): Pt underwent robotic assisted laparoscopic hysterectomy for menorrhagia/dysmenorrhea despite ocp;   She tolerated the procedure well and was stable for discharge from the pacu.       Final Diagnosis: Post-Op Diagnosis Codes:     * Dysmenorrhea [N94.6]     * Menorrhagia with regular cycle [N92.0]    Disposition: Home or Self Care    Follow Up/Patient Instructions:   Keep scheduled post op appt with dr holder  Pelvic rest x 6 wks; no lifting over 15lbs  Medications:  Reconciled Home Medications:      Medication List      START taking these medications    HYDROcodone-acetaminophen 5-325 mg per tablet  Commonly known as:  NORCO  Take 1 tablet by mouth every 4 (four) hours as needed for Pain.     ibuprofen 800 MG tablet  Commonly known as:   ADVIL,MOTRIN  Take 1 tablet (800 mg total) by mouth every 8 (eight) hours as needed for Pain.        CONTINUE taking these medications    albuterol 90 mcg/actuation inhaler  Inhale into the lungs.        STOP taking these medications    norgestimate-ethinyl estradiol 0.18/0.215/0.25 mg-35 mcg (28) tablet  Commonly known as:  ORTHO TRI-CYCLEN,TRI-SPRINTEC            Discharge Procedure Orders  Diet general     Other restrictions (specify):   Order Comments: Pelvic rest x 6 wks     Call MD for:  temperature >100.4     Call MD for:  persistent nausea and vomiting     Call MD for:  severe uncontrolled pain     Call MD for:   Order Comments: Bleeding more than 1 pad /hr x 2 hrs     No dressing needed       Follow-up Information     Carolyn Vogel MD.    Specialty:  Obstetrics and Gynecology  Why:  post op appt (scheduled)  Contact information:  [address] [facility]  3rd floor  [place]  [place]   [phone]

## 2018-05-30 NOTE — ASSESSMENT & PLAN NOTE
Menorrhagia despite use of ocp  Ready to proceed with robotic hysterectomy with bilateral salpingectomy  Reviewed robotic assisted laparoscopic hysterectomy with bilateral salpingectomy procedure in detail.   Reviewed risks including but not limited to infection, bleeding, damage to bowel/bladder, cva;htn, death.  Pt aware procedure will render her unable to have children.     All questions answered to the best of my ability.  Alternatives reviewed --continue ocp, mirena/nsaid, depo, nuva ring, nexplanon.  Patient is adamant about decision to proceed with hysterectomy;  Consents previously signed and witnessed.    Routine antibiotics

## 2018-05-30 NOTE — HPI
33 y/o  with worsening menorrhagia despite use of ocp.  Patient wishes definitive management with hysterectomy.  Patient wishes to retain ovaries

## 2018-05-30 NOTE — SUBJECTIVE & OBJECTIVE
Reports using ocp monthly, however, menses heavy, using ultra tampon, and has to change q 1-2 hours.  Reports no desire for future fertility.    The uterus measures 7.2 x 4.3 x 5.1 cm.  Possible arcuate uterus morphology is seen.  The endometrial stripe measures on the order of 14 mm in thickness which is top-normal.  Right ovary measures 2.7 x 1.8 x 1.8 cm.  It demonstrates flow on Doppler interrogation.  There is a mildly complex appearing right ovarian cyst that measures 1.4 cm with irregular margins likely an involuting cyst.  The left ovary measures 2.8 x 1.9 x 1.4 cm and contains normal follicles and also demonstrates flow.    Obstetric History       T2      L2     SAB0   TAB0   Ectopic0   Multiple0   Live Births2       # Outcome Date GA Lbr Chip/2nd Weight Sex Delivery Anes PTL Lv   2 Term      Vag-Spont   ANAMARIA   1 Term      Vag-Spont   ANAMARIA        Past Medical History:   Diagnosis Date    Asthma     PONV (postoperative nausea and vomiting)      Past Surgical History:   Procedure Laterality Date    EXTERNAL EAR SURGERY Bilateral     Several    TONSILLECTOMY         PTA Medications   Medication Sig    albuterol 90 mcg/actuation inhaler Inhale into the lungs.    norgestimate-ethinyl estradiol (ORTHO TRI-CYCLEN,TRI-SPRINTEC) 0.18/0.215/0.25 mg-35 mcg (28) tablet Take 1 tablet by mouth once daily.       Review of patient's allergies indicates:   Allergen Reactions    Sulfa (sulfonamide antibiotics)      As a child Unknown        Family History     Problem Relation (Age of Onset)    Asthma Mother    Breast cancer Paternal Grandmother    Hypertension Mother    Ovarian cancer Paternal Grandmother    Polycystic ovary syndrome Mother        Social History Main Topics    Smoking status: Never Smoker    Smokeless tobacco: Never Used    Alcohol use Yes      Comment: occasional    Drug use: No    Sexual activity: Yes     Partners: Male     Birth control/ protection: Pill     Review of Systems    Genitourinary: Positive for menorrhagia.   All other systems reviewed and are negative.     Objective:     Vital Signs (Most Recent):  Temp: 97.9 °F (36.6 °C) (05/30/18 0715)  Pulse: 77 (05/30/18 0715)  Resp: 16 (05/30/18 0715)  BP: (!) 160/100 (05/30/18 0715)  SpO2: 98 % (05/30/18 0715) Vital Signs (24h Range):  Temp:  [97.9 °F (36.6 °C)] 97.9 °F (36.6 °C)  Pulse:  [77] 77  Resp:  [16] 16  SpO2:  [98 %] 98 %  BP: (160)/(100) 160/100     Weight: 94.8 kg (208 lb 15.9 oz)  Body mass index is 34.25 kg/m².    Patient's last menstrual period was 05/17/2018 (exact date).    Physical Exam:   Constitutional: She appears well-developed.     Eyes: Conjunctivae and EOM are normal. Pupils are equal, round, and reactive to light.    Neck: Normal range of motion. Neck supple.     Pulmonary/Chest: Effort normal.        Abdominal: Soft. Bowel sounds are normal.             Musculoskeletal: Normal range of motion.       Neurological: She is alert.    Skin: Skin is warm.    Psychiatric: She has a normal mood and affect.       Laboratory:  Beta HCG: No results for input(s): HCGPREGUR in the last 48 hours.  BMP: No results for input(s): GLU, NA, K, CL, CO2, BUN, CREATININE, CALCIUM, MG in the last 48 hours.  CBC: No results for input(s): WBC, RBC, HGB, HCT, PLT, MCV, MCH, MCHC in the last 48 hours.  hgb 12.9;   bhcg  <1.2  Diagnostic Results:  US: Reviewed  see results above

## 2018-05-31 ENCOUNTER — TELEPHONE (OUTPATIENT)
Dept: OBSTETRICS AND GYNECOLOGY | Facility: CLINIC | Age: 33
End: 2018-05-31

## 2018-05-31 DIAGNOSIS — Z90.710 S/P LAPAROSCOPIC HYSTERECTOMY: Primary | ICD-10-CM

## 2018-05-31 RX ORDER — OXYCODONE AND ACETAMINOPHEN 7.5; 325 MG/1; MG/1
1 TABLET ORAL EVERY 4 HOURS PRN
Qty: 20 TABLET | Refills: 0 | Status: SHIPPED | OUTPATIENT
Start: 2018-05-31 | End: 2018-06-13

## 2018-05-31 NOTE — TELEPHONE ENCOUNTER
Patient is taking Norco every 4 hours, and Ibuprofen every 8 hours.  The pain is so intense that she is nauseated and throwing up.  Told patient I would discuss with Dr. Vogel and advise.  Patient verbalized understanding.

## 2018-05-31 NOTE — TELEPHONE ENCOUNTER
Patient reports sharp stabbing pains , should pain and a dull ache at lower part of stomach    Ate pasta last night; apple so far    Has wave of nausea and increase pain when spasm dull ache pain occurs;     +void; minimal vaignla bleeding  +ambulation    Also c/o cough    Denies fever/chills    Patient talking in normal speech pattern    Advised warm compress; light diet--avoid gasy foods;     Aware when to return to ER;;     Accepts stronger pain medication; rx sent to [facility] per pt request

## 2018-05-31 NOTE — ANESTHESIA POSTPROCEDURE EVALUATION
"Anesthesia Post Evaluation    Patient: Cherelle Shukla    Procedure(s) Performed: Procedure(s) (LRB):  XI ROBOTIC ASSISTED LAPAROSCOPIC HYSTERECTOMY (N/A)  ROBOTIC SALPINGECTOMY (Bilateral)    Final Anesthesia Type: general  Patient location during evaluation: PACU  Patient participation: Yes- Able to Participate  Level of consciousness: awake and alert and oriented  Post-procedure vital signs: reviewed and stable  Pain management: adequate  Airway patency: patent  PONV status at discharge: No PONV  Anesthetic complications: no      Cardiovascular status: hemodynamically stable  Respiratory status: unassisted, room air and spontaneous ventilation  Hydration status: euvolemic  Follow-up not needed.        Visit Vitals  /79   Pulse 70   Temp 36.7 °C (98.1 °F) (Temporal)   Resp 17   Ht 5' 5.5" (1.664 m)   Wt 94.8 kg (208 lb 15.9 oz)   LMP 05/17/2018 (Exact Date)   SpO2 (!) 93%   Breastfeeding? No   BMI 34.25 kg/m²       Pain/Christie Score: Pain Assessment Performed: Yes (5/30/2018 11:45 AM)  Presence of Pain: complains of pain/discomfort (5/30/2018 11:45 AM)  Pain Rating Prior to Med Admin: 6 (5/30/2018 11:41 AM)  Christie Score: 10 (5/30/2018 11:45 AM)      "

## 2018-06-01 ENCOUNTER — PATIENT MESSAGE (OUTPATIENT)
Dept: OBSTETRICS AND GYNECOLOGY | Facility: CLINIC | Age: 33
End: 2018-06-01

## 2018-06-08 ENCOUNTER — TELEPHONE (OUTPATIENT)
Dept: OBSTETRICS AND GYNECOLOGY | Facility: CLINIC | Age: 33
End: 2018-06-08

## 2018-06-08 NOTE — TELEPHONE ENCOUNTER
Spoke to pt and informed her per Dr. Vogel, she can not ride the riding  yet. Pt verbalized understanding.

## 2018-06-08 NOTE — TELEPHONE ENCOUNTER
----- Message from Palma Marino sent at 6/8/2018 10:23 AM CDT -----  Contact: patient  Patient called and stated that she is feeling better and she wants to know if she is allowed to ride her riding . She can be contacted at [phone].    Thanks,  Palma

## 2018-06-11 ENCOUNTER — TELEPHONE (OUTPATIENT)
Dept: OBSTETRICS AND GYNECOLOGY | Facility: CLINIC | Age: 33
End: 2018-06-11

## 2018-06-11 VITALS
HEIGHT: 66 IN | TEMPERATURE: 98 F | DIASTOLIC BLOOD PRESSURE: 79 MMHG | OXYGEN SATURATION: 93 % | HEART RATE: 70 BPM | RESPIRATION RATE: 17 BRPM | BODY MASS INDEX: 33.59 KG/M2 | SYSTOLIC BLOOD PRESSURE: 124 MMHG | WEIGHT: 209 LBS

## 2018-06-11 NOTE — TELEPHONE ENCOUNTER
----- Message from Kamran Mosquera sent at 6/11/2018 12:04 PM CDT -----  Contact: pt   Still experiencing pain, trying to go back to work this week, inq about rx.       ..[phone] (home)

## 2018-06-11 NOTE — TELEPHONE ENCOUNTER
Spoke to the pt. And discuused that she can use aleve or ibuprofen. Pt. Acknowledged understanding. etienne Ahumada

## 2018-06-13 ENCOUNTER — OFFICE VISIT (OUTPATIENT)
Dept: OBSTETRICS AND GYNECOLOGY | Facility: CLINIC | Age: 33
End: 2018-06-13
Payer: COMMERCIAL

## 2018-06-13 VITALS
BODY MASS INDEX: 33.27 KG/M2 | HEIGHT: 66 IN | WEIGHT: 207 LBS | SYSTOLIC BLOOD PRESSURE: 128 MMHG | DIASTOLIC BLOOD PRESSURE: 68 MMHG

## 2018-06-13 DIAGNOSIS — Z90.710 S/P LAPAROSCOPIC HYSTERECTOMY: Primary | ICD-10-CM

## 2018-06-13 PROBLEM — N92.0 MENORRHAGIA: Status: RESOLVED | Noted: 2018-05-30 | Resolved: 2018-06-13

## 2018-06-13 PROCEDURE — 99999 PR PBB SHADOW E&M-EST. PATIENT-LVL II: CPT | Mod: PBBFAC,,, | Performed by: OBSTETRICS & GYNECOLOGY

## 2018-06-13 PROCEDURE — 99024 POSTOP FOLLOW-UP VISIT: CPT | Mod: S$GLB,,, | Performed by: OBSTETRICS & GYNECOLOGY

## 2018-06-13 RX ORDER — IBUPROFEN 200 MG
400 TABLET ORAL EVERY 6 HOURS PRN
COMMUNITY
End: 2018-07-10

## 2018-06-13 NOTE — PROGRESS NOTES
Subjective:       Patient ID: Cherelle Shukla is a 32 y.o. female.    Chief Complaint:  Post-op Evaluation ([facility], [facility]SO 18)      History of Present Illness  HPI  Postoperative Follow-up  Patient presents to the clinic 2 weeks status post Davinci assisted hysterectomy for abnormal uterine bleeding. Eating a regular diet without difficulty. Bowel movements are normal. The patient is not having any pain.   No problems urination  Denies hot flushes, night sweats  Has returned to work  Minimal vaginal bleeding      GYN & OB History  Patient's last menstrual period was 2017 (exact date).   Date of Last Pap: 2017    OB History    Para Term  AB Living   2 2 2     2   SAB TAB Ectopic Multiple Live Births           2      # Outcome Date GA Lbr Chip/2nd Weight Sex Delivery Anes PTL Lv   2 Term      Vag-Spont   ANAMARIA   1 Term      Vag-Spont   ANAMARIA          Review of Systems  Review of Systems   Constitutional: Negative for activity change, appetite change, chills, diaphoresis, fatigue, fever and unexpected weight change.   HENT: Negative for mouth sores and tinnitus.    Eyes: Negative for discharge and visual disturbance.   Respiratory: Negative for cough, shortness of breath and wheezing.    Cardiovascular: Negative for chest pain, palpitations and leg swelling.   Gastrointestinal: Negative for abdominal pain, bloating, blood in stool, constipation, diarrhea, nausea and vomiting.   Endocrine: Negative for diabetes, hair loss, hot flashes, hyperthyroidism and hypothyroidism.   Genitourinary: Negative for decreased libido, dyspareunia, dysuria, flank pain, frequency, genital sores, hematuria, menorrhagia, menstrual problem, pelvic pain, urgency, vaginal bleeding, vaginal discharge, vaginal pain, dysmenorrhea, urinary incontinence, postcoital bleeding, postmenopausal bleeding and vaginal odor.   Musculoskeletal: Negative for back pain and myalgias.   Skin:  Negative for rash, no acne and hair changes.    Neurological: Negative for seizures, syncope, numbness and headaches.   Hematological: Negative for adenopathy. Does not bruise/bleed easily.   Psychiatric/Behavioral: Negative for depression and sleep disturbance. The patient is not nervous/anxious.    Breast: Negative for breast mass, breast pain, nipple discharge and skin changes          Objective:    Physical Exam:   Constitutional: She appears well-developed.     Eyes: Conjunctivae and EOM are normal. Pupils are equal, round, and reactive to light.    Neck: Normal range of motion. Neck supple.     Pulmonary/Chest: Effort normal.        Abdominal: Soft. Bowel sounds are normal. She exhibits abdominal incision (inicision x 4 --healing well; no errythema, exudate, induration).             Musculoskeletal: Normal range of motion.       Neurological: She is alert.    Skin: Skin is warm.    Psychiatric: She has a normal mood and affect.          Assessment:       Encounter Diagnosis   Name Primary?    s/p robotic laparoscopic hysterectomy with bilateral salpingectomy Yes             Plan:      Path report reviewed with patient  Continue post op care; pelvic rest

## 2018-06-19 ENCOUNTER — PATIENT MESSAGE (OUTPATIENT)
Dept: OBSTETRICS AND GYNECOLOGY | Facility: CLINIC | Age: 33
End: 2018-06-19

## 2018-06-19 NOTE — TELEPHONE ENCOUNTER
Spoke to pt, states she noticed redness to her incision so she pushed on it and pus came out. States she felt a hard bump. Scheduled pt for appt for 06/20/2018 with Dr. Vogel at 9:15 am.     JOSE LUIS Alvarez LPN

## 2018-06-20 ENCOUNTER — OFFICE VISIT (OUTPATIENT)
Dept: OBSTETRICS AND GYNECOLOGY | Facility: CLINIC | Age: 33
End: 2018-06-20
Payer: COMMERCIAL

## 2018-06-20 VITALS
BODY MASS INDEX: 33.59 KG/M2 | SYSTOLIC BLOOD PRESSURE: 122 MMHG | HEIGHT: 66 IN | WEIGHT: 209 LBS | DIASTOLIC BLOOD PRESSURE: 74 MMHG

## 2018-06-20 DIAGNOSIS — Z90.710 S/P LAPAROSCOPIC HYSTERECTOMY: Primary | ICD-10-CM

## 2018-06-20 PROCEDURE — 99999 PR PBB SHADOW E&M-EST. PATIENT-LVL II: CPT | Mod: PBBFAC,,, | Performed by: OBSTETRICS & GYNECOLOGY

## 2018-06-20 PROCEDURE — 99024 POSTOP FOLLOW-UP VISIT: CPT | Mod: S$GLB,,, | Performed by: OBSTETRICS & GYNECOLOGY

## 2018-06-20 NOTE — PROGRESS NOTES
Subjective:       Patient ID: Cherelle Shukla is a 32 y.o. female.    Chief Complaint:  Drainage from Incision      History of Present Illness  HPI  reports puffiness and drainage noted to right incision   Reports milky white drainage noted  Denies fever/chills  Denies abdominal pains  Washing abdomen with dial soap    GYN & OB History  Patient's last menstrual period was 05/15/2018 (approximate).   Date of Last Pap: 2017    OB History    Para Term  AB Living   2 2 2     2   SAB TAB Ectopic Multiple Live Births           2      # Outcome Date GA Lbr Chip/2nd Weight Sex Delivery Anes PTL Lv   2 Term      Vag-Spont   ANAMARIA   1 Term      Vag-Spont   ANAMARIA          Review of Systems  Review of Systems   Gastrointestinal: Positive for abdominal pain.   All other systems reviewed and are negative.          Objective:    Physical Exam:             Abdominal: She exhibits abdominal incision (healing well x 4; incision on right -probed --scant white noted; no purulent drainage, no surrounding errythema, induration).                          Assessment:     Encounter Diagnosis   Name Primary?    s/p robotic laparoscopic hysterectomy with bilateral salpingectomy Yes               Plan:      Continue post op care  Reassurance given

## 2018-07-06 ENCOUNTER — TELEPHONE (OUTPATIENT)
Dept: OBSTETRICS AND GYNECOLOGY | Facility: CLINIC | Age: 33
End: 2018-07-06

## 2018-07-06 ENCOUNTER — PATIENT MESSAGE (OUTPATIENT)
Dept: OBSTETRICS AND GYNECOLOGY | Facility: CLINIC | Age: 33
End: 2018-07-06

## 2018-07-06 NOTE — TELEPHONE ENCOUNTER
----- Message from Geovany Karimi sent at 7/6/2018 11:46 AM CDT -----  Contact: Pt.   Pt is calling regarding having a yeast infection and would like the doctor to send something to the pharmacy. ..[phone]        .  [facility] [address] AT [facility]  [address]  [facility] [address]  Phone: [phone] Fax: [phone]

## 2018-07-10 ENCOUNTER — OFFICE VISIT (OUTPATIENT)
Dept: OBSTETRICS AND GYNECOLOGY | Facility: CLINIC | Age: 33
End: 2018-07-10
Payer: COMMERCIAL

## 2018-07-10 VITALS
BODY MASS INDEX: 33.98 KG/M2 | WEIGHT: 211.44 LBS | HEIGHT: 66 IN | SYSTOLIC BLOOD PRESSURE: 132 MMHG | DIASTOLIC BLOOD PRESSURE: 88 MMHG

## 2018-07-10 DIAGNOSIS — R10.2 VULVAR PAIN: Primary | ICD-10-CM

## 2018-07-10 DIAGNOSIS — N89.8 VAGINAL DISCHARGE: ICD-10-CM

## 2018-07-10 PROCEDURE — 99999 PR PBB SHADOW E&M-EST. PATIENT-LVL III: CPT | Mod: PBBFAC,,, | Performed by: OBSTETRICS & GYNECOLOGY

## 2018-07-10 PROCEDURE — 99214 OFFICE O/P EST MOD 30 MIN: CPT | Mod: 24,S$GLB,, | Performed by: OBSTETRICS & GYNECOLOGY

## 2018-07-10 PROCEDURE — 87660 TRICHOMONAS VAGIN DIR PROBE: CPT

## 2018-07-10 PROCEDURE — 87529 HSV DNA AMP PROBE: CPT

## 2018-07-10 PROCEDURE — 3008F BODY MASS INDEX DOCD: CPT | Mod: CPTII,S$GLB,, | Performed by: OBSTETRICS & GYNECOLOGY

## 2018-07-10 RX ORDER — VALACYCLOVIR HYDROCHLORIDE 1 G/1
1000 TABLET, FILM COATED ORAL 2 TIMES DAILY
Qty: 20 TABLET | Refills: 0 | Status: SHIPPED | OUTPATIENT
Start: 2018-07-10 | End: 2018-07-24 | Stop reason: ALTCHOICE

## 2018-07-10 RX ORDER — NAPROXEN 500 MG/1
TABLET ORAL
COMMUNITY
Start: 2018-07-08

## 2018-07-10 RX ORDER — FLUCONAZOLE 200 MG/1
TABLET ORAL
COMMUNITY
Start: 2018-07-09 | End: 2018-07-10

## 2018-07-10 NOTE — PROGRESS NOTES
Subjective:       Patient ID: Cherelle Shukla is a 32 y.o. female.    Chief Complaint:  Post-op Evaluation      History of Present Illness  HPI  c/o vaginal irritation    Has used diflucan without relief  Reports being treated for sinus infection prior to vulvar irritation    GYN & OB History  Patient's last menstrual period was 2018 (exact date).   Date of Last Pap: 2017    OB History    Para Term  AB Living   2 2 2     2   SAB TAB Ectopic Multiple Live Births           2      # Outcome Date GA Lbr Chip/2nd Weight Sex Delivery Anes PTL Lv   2 Term 13   2.92 kg (6 lb 7 oz) F Vag-Spont   ANAMARIA   1 Term 11   3.255 kg (7 lb 2.8 oz) M Vag-Spont   ANAMARIA          Review of Systems  Review of Systems   Genitourinary: Positive for vaginal discharge and vaginal pain.   All other systems reviewed and are negative.          Objective:    Physical Exam:   Constitutional: She appears well-developed.     Eyes: Conjunctivae and EOM are normal. Pupils are equal, round, and reactive to light.    Neck: Normal range of motion. Neck supple.     Pulmonary/Chest: Effort normal. Right breast exhibits no mass, no nipple discharge, no skin change, no tenderness and presence. Left breast exhibits no mass, no nipple discharge, no skin change, no tenderness and presence. Breasts are symmetrical.        Abdominal: Soft.     Genitourinary: Rectum normal and vagina normal.       Pelvic exam was performed with patient supine. There is lesion on the right labia. There is lesion on the left labia. Uterus is absent. Right adnexum displays no mass. Left adnexum displays no mass. Vaginal discharge: pink yellow. Vaginal cuff normal.Cervix exhibits absence. Also,  no recent pap smear            Musculoskeletal: Normal range of motion.       Neurological: She is alert.    Skin: Skin is warm.    Psychiatric: She has a normal mood and affect.          Assessment:        1. Vulvar pain    2. Vaginal discharge                Plan:   Suspect herpetic vulvitis  Culture obtained  rx sent for valtrex  Affirm done of vaginal discharge  Continue pelvic rest

## 2018-07-11 ENCOUNTER — TELEPHONE (OUTPATIENT)
Dept: OBSTETRICS AND GYNECOLOGY | Facility: CLINIC | Age: 33
End: 2018-07-11

## 2018-07-11 ENCOUNTER — PATIENT MESSAGE (OUTPATIENT)
Dept: OBSTETRICS AND GYNECOLOGY | Facility: CLINIC | Age: 33
End: 2018-07-11

## 2018-07-11 DIAGNOSIS — N76.0 BACTERIAL VAGINOSIS: Primary | ICD-10-CM

## 2018-07-11 DIAGNOSIS — B96.89 BACTERIAL VAGINOSIS: Primary | ICD-10-CM

## 2018-07-11 LAB
CANDIDA RRNA VAG QL PROBE: NEGATIVE
G VAGINALIS RRNA GENITAL QL PROBE: POSITIVE
T VAGINALIS RRNA GENITAL QL PROBE: NEGATIVE

## 2018-07-11 RX ORDER — METRONIDAZOLE 500 MG/1
500 TABLET ORAL EVERY 12 HOURS
Qty: 14 TABLET | Refills: 0 | Status: SHIPPED | OUTPATIENT
Start: 2018-07-11 | End: 2018-07-19

## 2018-07-12 ENCOUNTER — PATIENT MESSAGE (OUTPATIENT)
Dept: OBSTETRICS AND GYNECOLOGY | Facility: CLINIC | Age: 33
End: 2018-07-12

## 2018-07-13 NOTE — TELEPHONE ENCOUNTER
Express to Pt that the results have not come back yet and once it does we will contact her. Pt verbalizes understanding.  DS

## 2018-07-14 LAB
HSV1 DNA SPEC QL NAA+PROBE: POSITIVE
HSV2 DNA SPEC QL NAA+PROBE: NEGATIVE
SPECIMEN SOURCE: ABNORMAL

## 2018-07-24 ENCOUNTER — PATIENT MESSAGE (OUTPATIENT)
Dept: OBSTETRICS AND GYNECOLOGY | Facility: CLINIC | Age: 33
End: 2018-07-24

## 2018-07-24 ENCOUNTER — OFFICE VISIT (OUTPATIENT)
Dept: OBSTETRICS AND GYNECOLOGY | Facility: CLINIC | Age: 33
End: 2018-07-24
Payer: COMMERCIAL

## 2018-07-24 VITALS
DIASTOLIC BLOOD PRESSURE: 64 MMHG | BODY MASS INDEX: 34.01 KG/M2 | WEIGHT: 211.63 LBS | SYSTOLIC BLOOD PRESSURE: 110 MMHG | HEIGHT: 66 IN

## 2018-07-24 DIAGNOSIS — Z90.710 S/P LAPAROSCOPIC HYSTERECTOMY: Primary | ICD-10-CM

## 2018-07-24 DIAGNOSIS — B00.9 HSV INFECTION: ICD-10-CM

## 2018-07-24 PROCEDURE — 99999 PR PBB SHADOW E&M-EST. PATIENT-LVL II: CPT | Mod: PBBFAC,,, | Performed by: OBSTETRICS & GYNECOLOGY

## 2018-07-24 PROCEDURE — 99024 POSTOP FOLLOW-UP VISIT: CPT | Mod: S$GLB,,, | Performed by: OBSTETRICS & GYNECOLOGY

## 2018-07-24 RX ORDER — VALACYCLOVIR HYDROCHLORIDE 500 MG/1
500 TABLET, FILM COATED ORAL DAILY
Qty: 30 TABLET | Refills: 11 | Status: SHIPPED | OUTPATIENT
Start: 2018-07-24 | End: 2019-07-24

## 2018-07-24 NOTE — PROGRESS NOTES
Subjective:       Patient ID: Cherelle Shukla is a 32 y.o. female.    Chief Complaint:  Post-op Evaluation      History of Present Illness  HPI  Postoperative Follow-up  Patient presents to the clinic 8 weeks status post Davinci assisted hysterectomy with bilateral salpingectomy for abnormal uterine bleeding. Eating a regular diet without difficulty. Bowel movements are normal. The patient is not having any pain.  Vulvar HSV has resolved; denies vaginal bleeding; no problem void;  Has not attempted intercourse      GYN & OB History  Patient's last menstrual period was 2018 (exact date).   Date of Last Pap: 2017    OB History    Para Term  AB Living   2 2 2     2   SAB TAB Ectopic Multiple Live Births           2      # Outcome Date GA Lbr Chip/2nd Weight Sex Delivery Anes PTL Lv   2 Term 13   2.92 kg (6 lb 7 oz) F Vag-Spont   ANAMARIA   1 Term 11   3.255 kg (7 lb 2.8 oz) M Vag-Spont   ANAMARIA          Review of Systems  Review of Systems   Constitutional: Negative for activity change, appetite change, chills, diaphoresis, fatigue, fever and unexpected weight change.   HENT: Negative for mouth sores and tinnitus.    Eyes: Negative for discharge and visual disturbance.   Respiratory: Negative for cough, shortness of breath and wheezing.    Cardiovascular: Negative for chest pain, palpitations and leg swelling.   Gastrointestinal: Negative for abdominal pain, bloating, blood in stool, constipation, diarrhea, nausea and vomiting.   Endocrine: Negative for diabetes, hair loss, hot flashes, hyperthyroidism and hypothyroidism.   Genitourinary: Negative for decreased libido, dyspareunia, dysuria, flank pain, frequency, genital sores, hematuria, menorrhagia, menstrual problem, pelvic pain, urgency, vaginal bleeding, vaginal discharge, vaginal pain, dysmenorrhea, urinary incontinence, postcoital bleeding, postmenopausal bleeding and vaginal odor.   Musculoskeletal: Negative for back pain and  myalgias.   Skin:  Negative for rash, no acne and hair changes.   Neurological: Negative for seizures, syncope, numbness and headaches.   Hematological: Negative for adenopathy. Does not bruise/bleed easily.   Psychiatric/Behavioral: Negative for depression and sleep disturbance. The patient is not nervous/anxious.    Breast: Negative for breast mass, breast pain, nipple discharge and skin changes  All other systems reviewed and are negative.          Objective:    Physical Exam:   Constitutional: She appears well-developed.     Eyes: Conjunctivae and EOM are normal. Pupils are equal, round, and reactive to light.    Neck: Normal range of motion. Neck supple.     Pulmonary/Chest: Effort normal.        Abdominal: Soft.     Genitourinary: Rectum normal. Uterus is absent. Right adnexum displays no mass. Left adnexum displays no mass. Vaginal cuff normal.Labial bartholins normal.Cervix exhibits absence. Also,  no recent pap smear    Genitourinary Comments: Scant brown vaginal discharge           Musculoskeletal: Normal range of motion.       Neurological: She is alert.    Skin: Skin is warm.    Psychiatric: She has a normal mood and affect.          Assessment:         Encounter Diagnoses   Name Primary?    HSV infection     s/p robotic laparoscopic hysterectomy with bilateral salpingectomy Yes             Plan:      Released to all activities except intercourse; advise continue pelvic rest for approx 6 more weeks  Pt wishes to use valtrex daily; rx sent  Continue yearly well woman exam

## 2023-09-11 NOTE — TELEPHONE ENCOUNTER
----- Message from Davey Russell sent at 7/11/2018  1:18 PM CDT -----  Contact: pt  She's calling in regards to her results, pls advise, [phone] (work)    Pt tolerrated Tap block well  . Pt awaiting surgery at this time.

## (undated) DEVICE — SEE MEDLINE ITEM 146372

## (undated) DEVICE — SUPPORT ULNA NERVE PROTECTOR

## (undated) DEVICE — SEE MEDLINE ITEM 152622

## (undated) DEVICE — DRAPE STERI LONG

## (undated) DEVICE — TIP RUMI WHITE DISP UMW676

## (undated) DEVICE — GLOVE PROTEXIS HYDROGEL SZ6.5

## (undated) DEVICE — SEE MEDLINE ITEM 157117

## (undated) DEVICE — SOL NS 1000CC

## (undated) DEVICE — NDL PNEUMO INSUFFLATI 120MM

## (undated) DEVICE — ELECTRODE REM PLYHSV RETURN 9

## (undated) DEVICE — GLOVE 6.5 PROTEXIS PI MICRO

## (undated) DEVICE — SOL 9P NACL IRR PIC IL

## (undated) DEVICE — DRAPE LAVH LAPAROSCOPY W/FLUID

## (undated) DEVICE — SEE MEDLINE ITEM 146292

## (undated) DEVICE — KIT ANTIFOG

## (undated) DEVICE — COVER TIP CURVED SCISSORS XI

## (undated) DEVICE — APPLICATOR CHLORAPREP ORN 26ML

## (undated) DEVICE — EVACUATOR KIT SMOKE PLUME AWAY

## (undated) DEVICE — COVER OVERHEAD SURG LT BLUE

## (undated) DEVICE — SEAL UNIVERSAL 5MM-8MM XI

## (undated) DEVICE — SYR 3CC LUER LOC

## (undated) DEVICE — SEE MEDLINE ITEM 157027

## (undated) DEVICE — DRAPE COLUMN DAVINCI XI

## (undated) DEVICE — SUT VICRYL PLUS 0 CT1 36IN

## (undated) DEVICE — POSITIONER HEAD DONUT 9IN FOAM

## (undated) DEVICE — TUBING HEATED INSUFFLATOR

## (undated) DEVICE — SOL ELECTROLUBE ANTI-STIC

## (undated) DEVICE — SYR 10CC LUER LOCK

## (undated) DEVICE — OCCLUDER COLPO-PNEUMO STERILE

## (undated) DEVICE — SUT CTD VICRYL PLUS 4/0

## (undated) DEVICE — SEE MEDLINE ITEM 154981

## (undated) DEVICE — OBTURATOR BLADELESS 8MM XI CLR

## (undated) DEVICE — SYR 50CC LL

## (undated) DEVICE — ADHESIVE DERMABOND ADVANCED

## (undated) DEVICE — IRRIGATOR ENDOSCOPY DISP.

## (undated) DEVICE — Device

## (undated) DEVICE — DRAPE ARM DAVINCI XI

## (undated) DEVICE — SEE MEDLINE ITEM 157181

## (undated) DEVICE — GLOVE 7.0 PROTEXIS PI BLUE